# Patient Record
Sex: MALE | Race: WHITE
[De-identification: names, ages, dates, MRNs, and addresses within clinical notes are randomized per-mention and may not be internally consistent; named-entity substitution may affect disease eponyms.]

---

## 2017-10-21 ENCOUNTER — HOSPITAL ENCOUNTER (EMERGENCY)
Dept: HOSPITAL 8 - ED | Age: 27
Discharge: HOME | End: 2017-10-21
Payer: COMMERCIAL

## 2017-10-21 VITALS — DIASTOLIC BLOOD PRESSURE: 69 MMHG | SYSTOLIC BLOOD PRESSURE: 127 MMHG

## 2017-10-21 VITALS — HEIGHT: 70 IN | BODY MASS INDEX: 32.76 KG/M2 | WEIGHT: 228.84 LBS

## 2017-10-21 DIAGNOSIS — K92.1: Primary | ICD-10-CM

## 2017-10-21 LAB
AST SERPL-CCNC: 29 U/L (ref 15–37)
BUN SERPL-MCNC: 18 MG/DL (ref 7–18)
HCT VFR BLD CALC: 48.4 % (ref 39.2–51.8)
HGB BLD-MCNC: 16.4 G/DL (ref 13.7–18)
WBC # BLD AUTO: 5.6 X10^3/UL (ref 3.4–10)

## 2017-10-21 PROCEDURE — 85730 THROMBOPLASTIN TIME PARTIAL: CPT

## 2017-10-21 PROCEDURE — 99285 EMERGENCY DEPT VISIT HI MDM: CPT

## 2017-10-21 PROCEDURE — 74177 CT ABD & PELVIS W/CONTRAST: CPT

## 2017-10-21 PROCEDURE — 85610 PROTHROMBIN TIME: CPT

## 2017-10-21 PROCEDURE — 80053 COMPREHEN METABOLIC PANEL: CPT

## 2017-10-21 PROCEDURE — 85025 COMPLETE CBC W/AUTO DIFF WBC: CPT

## 2017-10-21 PROCEDURE — 36415 COLL VENOUS BLD VENIPUNCTURE: CPT

## 2018-10-16 ENCOUNTER — HOSPITAL ENCOUNTER (EMERGENCY)
Dept: HOSPITAL 8 - ED | Age: 28
Discharge: HOME | End: 2018-10-16
Payer: COMMERCIAL

## 2018-10-16 VITALS — WEIGHT: 198.42 LBS | HEIGHT: 70 IN | BODY MASS INDEX: 28.41 KG/M2

## 2018-10-16 VITALS — SYSTOLIC BLOOD PRESSURE: 142 MMHG | DIASTOLIC BLOOD PRESSURE: 78 MMHG

## 2018-10-16 DIAGNOSIS — F41.1: Primary | ICD-10-CM

## 2018-10-16 DIAGNOSIS — F30.11: ICD-10-CM

## 2018-10-16 PROCEDURE — 99284 EMERGENCY DEPT VISIT MOD MDM: CPT

## 2018-11-07 ENCOUNTER — HOSPITAL ENCOUNTER (OUTPATIENT)
Dept: HOSPITAL 8 - ED | Age: 28
Setting detail: OBSERVATION
LOS: 8 days | Discharge: HOME | End: 2018-11-15
Attending: INTERNAL MEDICINE | Admitting: INTERNAL MEDICINE
Payer: COMMERCIAL

## 2018-11-07 VITALS — DIASTOLIC BLOOD PRESSURE: 68 MMHG | SYSTOLIC BLOOD PRESSURE: 105 MMHG

## 2018-11-07 VITALS — BODY MASS INDEX: 25.25 KG/M2 | HEIGHT: 70 IN | WEIGHT: 176.37 LBS

## 2018-11-07 DIAGNOSIS — M54.9: ICD-10-CM

## 2018-11-07 DIAGNOSIS — F30.2: Primary | ICD-10-CM

## 2018-11-07 DIAGNOSIS — F41.1: ICD-10-CM

## 2018-11-07 DIAGNOSIS — G47.00: ICD-10-CM

## 2018-11-07 DIAGNOSIS — G89.29: ICD-10-CM

## 2018-11-07 DIAGNOSIS — Z91.14: ICD-10-CM

## 2018-11-07 DIAGNOSIS — Z81.8: ICD-10-CM

## 2018-11-07 DIAGNOSIS — R73.03: ICD-10-CM

## 2018-11-07 DIAGNOSIS — F23: ICD-10-CM

## 2018-11-07 LAB
ALBUMIN SERPL-MCNC: 4.2 G/DL (ref 3.4–5)
ANION GAP SERPL CALC-SCNC: 9 MMOL/L (ref 5–15)
APAP SERPL-MCNC: < 2 MCG/ML (ref 10–30)
BASOPHILS # BLD AUTO: 0.02 X10^3/UL (ref 0–0.1)
BASOPHILS NFR BLD AUTO: 0 % (ref 0–1)
CALCIUM SERPL-MCNC: 8.9 MG/DL (ref 8.5–10.1)
CHLORIDE SERPL-SCNC: 104 MMOL/L (ref 98–107)
CREAT SERPL-MCNC: 0.87 MG/DL (ref 0.7–1.3)
EOSINOPHIL # BLD AUTO: 0.02 X10^3/UL (ref 0–0.4)
EOSINOPHIL NFR BLD AUTO: 0 % (ref 1–7)
ERYTHROCYTE [DISTWIDTH] IN BLOOD BY AUTOMATED COUNT: 13.3 % (ref 9.4–14.8)
EST. AVERAGE GLUCOSE BLD GHB EST-MCNC: 114 MG/DL (ref 0–126)
HBA1C MFR BLD: 5.6 % (ref 4.2–6.3)
LYMPHOCYTES # BLD AUTO: 1.45 X10^3/UL (ref 1–3.4)
LYMPHOCYTES NFR BLD AUTO: 19 % (ref 22–44)
MCH RBC QN AUTO: 29.9 PG (ref 27.5–34.5)
MCHC RBC AUTO-ENTMCNC: 34.2 G/DL (ref 33.2–36.2)
MCV RBC AUTO: 87.5 FL (ref 81–97)
MD: NO
MONOCYTES # BLD AUTO: 0.51 X10^3/UL (ref 0.2–0.8)
MONOCYTES NFR BLD AUTO: 7 % (ref 2–9)
NEUTROPHILS # BLD AUTO: 5.8 X10^3/UL (ref 1.8–6.8)
NEUTROPHILS NFR BLD AUTO: 74 % (ref 42–75)
PLATELET # BLD AUTO: 249 X10^3/UL (ref 130–400)
PMV BLD AUTO: 7.3 FL (ref 7.4–10.4)
RBC # BLD AUTO: 5.35 X10^6/UL (ref 4.38–5.82)
VANCOMYCIN TROUGH SERPL-MCNC: < 1.7 MG/DL (ref 2.8–20)

## 2018-11-07 PROCEDURE — 80307 DRUG TEST PRSMV CHEM ANLYZR: CPT

## 2018-11-07 PROCEDURE — 80048 BASIC METABOLIC PNL TOTAL CA: CPT

## 2018-11-07 PROCEDURE — 80329 ANALGESICS NON-OPIOID 1 OR 2: CPT

## 2018-11-07 PROCEDURE — G0480 DRUG TEST DEF 1-7 CLASSES: HCPCS

## 2018-11-07 PROCEDURE — 85025 COMPLETE CBC W/AUTO DIFF WBC: CPT

## 2018-11-07 PROCEDURE — G0378 HOSPITAL OBSERVATION PER HR: HCPCS

## 2018-11-07 PROCEDURE — 82040 ASSAY OF SERUM ALBUMIN: CPT

## 2018-11-07 PROCEDURE — 99285 EMERGENCY DEPT VISIT HI MDM: CPT

## 2018-11-07 PROCEDURE — 36415 COLL VENOUS BLD VENIPUNCTURE: CPT

## 2018-11-07 PROCEDURE — 83036 HEMOGLOBIN GLYCOSYLATED A1C: CPT

## 2018-11-07 PROCEDURE — 93005 ELECTROCARDIOGRAM TRACING: CPT

## 2018-11-08 VITALS — DIASTOLIC BLOOD PRESSURE: 69 MMHG | SYSTOLIC BLOOD PRESSURE: 109 MMHG

## 2018-11-08 VITALS — SYSTOLIC BLOOD PRESSURE: 106 MMHG | DIASTOLIC BLOOD PRESSURE: 69 MMHG

## 2018-11-08 RX ADMIN — ZIPRASIDONE HYDROCHLORIDE SCH MG: 40 CAPSULE ORAL at 21:03

## 2018-11-08 RX ADMIN — Medication SCH CAP: at 11:00

## 2018-11-09 VITALS — SYSTOLIC BLOOD PRESSURE: 143 MMHG | DIASTOLIC BLOOD PRESSURE: 74 MMHG

## 2018-11-09 VITALS — DIASTOLIC BLOOD PRESSURE: 75 MMHG | SYSTOLIC BLOOD PRESSURE: 116 MMHG

## 2018-11-09 RX ADMIN — Medication SCH CAP: at 08:27

## 2018-11-09 RX ADMIN — ZIPRASIDONE HYDROCHLORIDE SCH MG: 40 CAPSULE ORAL at 21:00

## 2018-11-10 VITALS — SYSTOLIC BLOOD PRESSURE: 110 MMHG | DIASTOLIC BLOOD PRESSURE: 74 MMHG

## 2018-11-10 VITALS — SYSTOLIC BLOOD PRESSURE: 117 MMHG | DIASTOLIC BLOOD PRESSURE: 74 MMHG

## 2018-11-10 RX ADMIN — Medication SCH CAP: at 09:05

## 2018-11-10 RX ADMIN — MIRTAZAPINE SCH MG: 15 TABLET, FILM COATED ORAL at 21:00

## 2018-11-10 RX ADMIN — ZIPRASIDONE HCL SCH MG: 20 CAPSULE ORAL at 21:57

## 2018-11-10 RX ADMIN — ACETAMINOPHEN PRN MG: 325 TABLET, FILM COATED ORAL at 00:49

## 2018-11-11 VITALS — DIASTOLIC BLOOD PRESSURE: 79 MMHG | SYSTOLIC BLOOD PRESSURE: 117 MMHG

## 2018-11-11 VITALS — DIASTOLIC BLOOD PRESSURE: 79 MMHG | SYSTOLIC BLOOD PRESSURE: 128 MMHG

## 2018-11-11 RX ADMIN — Medication SCH CAP: at 08:22

## 2018-11-11 RX ADMIN — ACETAMINOPHEN PRN MG: 325 TABLET, FILM COATED ORAL at 00:22

## 2018-11-11 RX ADMIN — MIRTAZAPINE SCH MG: 15 TABLET, FILM COATED ORAL at 22:16

## 2018-11-11 RX ADMIN — ZIPRASIDONE HCL SCH MG: 20 CAPSULE ORAL at 21:49

## 2018-11-11 RX ADMIN — ZIPRASIDONE HCL SCH MG: 20 CAPSULE ORAL at 08:30

## 2018-11-12 VITALS — SYSTOLIC BLOOD PRESSURE: 119 MMHG | DIASTOLIC BLOOD PRESSURE: 80 MMHG

## 2018-11-12 VITALS — DIASTOLIC BLOOD PRESSURE: 81 MMHG | SYSTOLIC BLOOD PRESSURE: 136 MMHG

## 2018-11-12 RX ADMIN — ZIPRASIDONE HCL SCH MG: 20 CAPSULE ORAL at 22:00

## 2018-11-12 RX ADMIN — MIRTAZAPINE SCH MG: 15 TABLET, FILM COATED ORAL at 22:00

## 2018-11-12 RX ADMIN — Medication SCH CAP: at 08:18

## 2018-11-12 RX ADMIN — ZIPRASIDONE HCL SCH MG: 20 CAPSULE ORAL at 08:18

## 2018-11-13 VITALS — SYSTOLIC BLOOD PRESSURE: 117 MMHG | DIASTOLIC BLOOD PRESSURE: 74 MMHG

## 2018-11-13 VITALS — DIASTOLIC BLOOD PRESSURE: 75 MMHG | SYSTOLIC BLOOD PRESSURE: 126 MMHG

## 2018-11-13 RX ADMIN — Medication SCH CAP: at 08:30

## 2018-11-13 RX ADMIN — ACETAMINOPHEN PRN MG: 325 TABLET, FILM COATED ORAL at 02:45

## 2018-11-13 RX ADMIN — ZIPRASIDONE HCL SCH MG: 20 CAPSULE ORAL at 08:29

## 2018-11-13 RX ADMIN — ZIPRASIDONE HCL SCH MG: 20 CAPSULE ORAL at 20:54

## 2018-11-14 VITALS — SYSTOLIC BLOOD PRESSURE: 121 MMHG | DIASTOLIC BLOOD PRESSURE: 81 MMHG

## 2018-11-14 VITALS — DIASTOLIC BLOOD PRESSURE: 74 MMHG | SYSTOLIC BLOOD PRESSURE: 136 MMHG

## 2018-11-14 RX ADMIN — Medication SCH CAP: at 08:21

## 2018-11-14 RX ADMIN — ZIPRASIDONE HCL SCH MG: 20 CAPSULE ORAL at 08:20

## 2018-11-14 RX ADMIN — ACETAMINOPHEN PRN MG: 325 TABLET, FILM COATED ORAL at 10:11

## 2018-11-14 RX ADMIN — ACETAMINOPHEN PRN MG: 325 TABLET, FILM COATED ORAL at 20:10

## 2018-11-15 VITALS — SYSTOLIC BLOOD PRESSURE: 120 MMHG | DIASTOLIC BLOOD PRESSURE: 70 MMHG

## 2018-11-15 RX ADMIN — Medication SCH CAP: at 09:00

## 2021-02-23 ENCOUNTER — HOSPITAL ENCOUNTER (EMERGENCY)
Facility: MEDICAL CENTER | Age: 31
End: 2021-02-23
Attending: EMERGENCY MEDICINE

## 2021-02-23 VITALS
DIASTOLIC BLOOD PRESSURE: 74 MMHG | HEIGHT: 70 IN | OXYGEN SATURATION: 97 % | TEMPERATURE: 98.3 F | RESPIRATION RATE: 16 BRPM | SYSTOLIC BLOOD PRESSURE: 135 MMHG | WEIGHT: 204.15 LBS | BODY MASS INDEX: 29.23 KG/M2 | HEART RATE: 79 BPM

## 2021-02-23 DIAGNOSIS — S00.11XA CONTUSION OF RIGHT EYELID, INITIAL ENCOUNTER: ICD-10-CM

## 2021-02-23 DIAGNOSIS — S01.111A RIGHT EYELID LACERATION, INITIAL ENCOUNTER: ICD-10-CM

## 2021-02-23 PROCEDURE — 700101 HCHG RX REV CODE 250: Performed by: EMERGENCY MEDICINE

## 2021-02-23 PROCEDURE — 99283 EMERGENCY DEPT VISIT LOW MDM: CPT | Mod: EDC

## 2021-02-23 RX ORDER — ERYTHROMYCIN 5 MG/G
OINTMENT OPHTHALMIC ONCE
Status: COMPLETED | OUTPATIENT
Start: 2021-02-23 | End: 2021-02-23

## 2021-02-23 RX ORDER — ZIPRASIDONE HYDROCHLORIDE 20 MG/1
200 CAPSULE ORAL 2 TIMES DAILY
COMMUNITY

## 2021-02-23 RX ORDER — PROPARACAINE HYDROCHLORIDE 5 MG/ML
2 SOLUTION/ DROPS OPHTHALMIC ONCE
Status: COMPLETED | OUTPATIENT
Start: 2021-02-23 | End: 2021-02-23

## 2021-02-23 RX ORDER — ERYTHROMYCIN 5 MG/G
OINTMENT OPHTHALMIC ONCE
Status: DISCONTINUED | OUTPATIENT
Start: 2021-02-23 | End: 2021-02-23

## 2021-02-23 RX ORDER — LAMOTRIGINE 100 MG/1
400 TABLET ORAL DAILY
COMMUNITY

## 2021-02-23 RX ADMIN — FLUORESCEIN SODIUM 1 MG: 1 STRIP OPHTHALMIC at 15:45

## 2021-02-23 RX ADMIN — ERYTHROMYCIN 1 DOSE: 5 OINTMENT OPHTHALMIC at 15:44

## 2021-02-23 RX ADMIN — PROPARACAINE HYDROCHLORIDE 2 DROP: 5 SOLUTION/ DROPS OPHTHALMIC at 15:45

## 2021-02-23 NOTE — ED TRIAGE NOTES
Cabrera Vazquez  30 y.o.  Chief Complaint   Patient presents with   • Eye Injury     son threw book at right eye, pt reports blurry vision due to the drainage     Patient to triage with above complaint.  Pt denies vision changes other than blurry due to drainage.  Eye busied and swollen.     Vitals:    02/23/21 1436   BP: 119/81   Pulse: 90   Resp: 14   Temp: 37.3 °C (99.1 °F)   SpO2: 99%     Triage process explained to patient, apologized for wait time, and returned to lobby.  Pt informed to notify staff of any change in condition. NAD at this time.

## 2021-02-23 NOTE — DISCHARGE INSTRUCTIONS
Use the erythromycin ointment 4 times per day for the next few days, applying one quarter of an inch underneath your lower lid the way we showed you here.  You can do this until you are sure you are feeling back to normal.  Tylenol and ibuprofen and cool compresses can help with the bruising and swelling, but the bruising is likely to get worse before it gets better, just like a black eye.  As long as you are not having worsening vision or worsening pain, it is okay to continue treating herself at home.  If you have any worsening concerns, use the contact information provided here for ophthalmology, or come to the nearest emergency department if ophthalmology cannot see you promptly.

## 2021-02-23 NOTE — ED PROVIDER NOTES
"ED Provider Note    Scribed for Gray Goddard M.D. by Elvin Mcfarlane. 2/23/2021  3:17 PM    CHIEF COMPLAINT  Chief Complaint   Patient presents with   • Eye Injury     son threw book at right eye, pt reports blurry vision due to the drainage       HPI  Cabrera Vazquez is a 30 y.o. male who presents to the Emergency Room with mild right eye pain onset earlier today. Per the patient, his son threw a children's book at his eye while they were wrestling. He reports additional symptoms of blurry vision and drainage, and denies other vision changes. No other exacerbating or alleviating factors were noted.  He has not had weakness or dizziness or confusion.  He presented independently without difficulty.  He is calm and cooperative.  He does not wear glasses or corrective lenses.  He is not on blood thinners and has no history of easy bleeding or bruising.  He said no recent illness including flulike symptoms.    REVIEW OF SYSTEMS  See HPI for further details.    PAST MEDICAL HISTORY    None pertinent    SOCIAL HISTORY  Social History     Tobacco Use   • Smoking status: Never Smoker   • Smokeless tobacco: Never Used   Substance and Sexual Activity   • Alcohol use: Never   • Drug use: No   • Sexual activity: Not noted        SURGICAL HISTORY  patient denies any surgical history    CURRENT MEDICATIONS  Home Medications     Reviewed by Jennifer Monsalve R.N. (Registered Nurse) on 02/23/21 at 1447  Med List Status: Partial   Medication Last Dose Status   buPROPion (WELLBUTRIN) 100 MG Tab  Active   lamoTRIgine (LAMICTAL) 100 MG Tab  Active   olanzapine (ZYPREXA) 5 MG Tab  Active   ziprasidone (GEODON) 20 MG Cap  Active                ALLERGIES  No Known Allergies    PHYSICAL EXAM  VITAL SIGNS: /81   Pulse 90   Temp 37.3 °C (99.1 °F) (Temporal)   Resp 14   Ht 1.778 m (5' 10\")   Wt 92.6 kg (204 lb 2.3 oz)   SpO2 99%   BMI 29.29 kg/m²   Pulse ox interpretation: I interpret this pulse ox as " normal.  Constitutional: Alert in no apparent distress.  HENT: Normocephalic, no corneal abrasions visualized upon exam with fluorescein.  PERRLA, EOMI.  No Rebecca sign.  Bruising and swelling to the right upper eyelid, small cut to the upper eyelid, measuring 1 to 2 mm, not violating the lid border or involving lashes. Bilateral external ears normal. Nose normal.   Eyes: Conjunctiva normal, non-icteric.   Heart: Regular rate and rythm, no murmurs.    Lungs: Clear to auscultation bilaterally.  Skin: Warm, Dry, No erythema, No rash.   Neurologic: Alert, Grossly non-focal.   Psychiatric: Affect normal, Judgment normal, Mood normal, Appears appropriate and not intoxicated.         COURSE & MEDICAL DECISION MAKING  The patient's VS, Nurses notes reviewed. (See chart for details)    3:17 PM Patient seen and examined at bedside. Applied proparacaine 0.5% and fluorescein ophthalmic strip 1 mg in the patient's eye at this time. Evaluate the eye further with a Woods lamp any abrasions to the eye. Informed the patient that there doesn't appear to be any injury to the eye itself.  We discussed increased intraocular pressure, but the patient has no pain to the eye itself, only lid pain, and after fluorescein, does not feel his vision is blurry.  Explained that for any possible irritation, nurses will show him how he can apply antibiotic ointment. Discussed plan to check the patient's visual acuity, and plans for discharge. Patient verbalizes understanding and agreement to this plan of care. Patient will be treated with proparacaine 0.5%, erythromycin ophthalmic ointment, and fluorescein ophthalmic strip 1 mg for his symptoms.     3:41 PM Recheck: Patient re-evaluated at bedside. Patient's visual acuity: R: 20/30, L: 20/20, B: 20/15. Patient reports feeling well. Discussed patient's condition and treatment plan, including plans for discharge.  The patient understood and is in agreement.  Have given contact information for  ophthalmology in case he is feeling worse.  We discussed emergency department return precautions as well.  He continues to report feeling better, now without any pain at all, which he attributes to the fluorescein and erythromycin ointment.  We showed him how to put a first dose on, which he found soothing, and I think it is a reasonable intervention, despite the lack of any obvious corneal abrasion.      The patient will return for new or worsening symptoms and is stable at the time of discharge.    The patient is referred to a primary physician for blood pressure management, diabetic screening, and for all other preventative health concerns.    DISPOSITION:  Patient will be discharged home in stable condition.    FOLLOW UP:  Juan Carlos Bee M.D.  [address] [place] [id number]  [phone]      If symptoms worsen    [facility], Emergency Dept  [address] [place] [id number] [phone]    If symptoms worsen      FINAL IMPRESSION  1. Right eyelid laceration, initial encounter    2. Contusion of right eyelid, initial encounter         Elvin FRANCO (Scribe), am scribing for, and in the presence of, Gray Goddard M.D..    Electronically signed by: Elvin Mcfarlane (Scribe), 2/23/2021    IGray M.D. personally performed the services described in this documentation, as scribed by Elvin Mcfarlane in my presence, and it is both accurate and complete.    E    The note accurately reflects work and decisions made by me.  Gray Goddard M.D.  2/23/2021  4:06 PM

## 2021-02-24 NOTE — ED NOTES
"Cabrera Vazquez D/C'd.  Discharge instructions including s/s to return to ED, follow up appointments, hydration importance and pain managment  provided to pt.    Pt verbalized understanding with no further questions and concerns.    Copy of discharge provided to pt.  Signed copy in chart.    Pt ambulates out of department; pt in NAD, awake, alert, and oriented.   VS /74   Pulse 79   Temp 36.8 °C (98.3 °F) (Temporal)   Resp 16   Ht 1.778 m (5' 10\")   Wt 92.6 kg (204 lb 2.3 oz)   SpO2 97%   BMI 29.29 kg/m²     "

## 2021-09-08 ENCOUNTER — NON-PROVIDER VISIT (OUTPATIENT)
Dept: URGENT CARE | Facility: PHYSICIAN GROUP | Age: 31
End: 2021-09-08

## 2021-09-08 DIAGNOSIS — Z02.1 PRE-EMPLOYMENT DRUG SCREENING: ICD-10-CM

## 2021-09-08 LAB
AMP AMPHETAMINE: NORMAL
AMP AMPHETAMINE: NORMAL
BAR BARBITURATES: NORMAL
BZO BENZODIAZEPINES: NORMAL
COC COCAINE: NORMAL
COC COCAINE: NORMAL
INT CON NEG: NORMAL
INT CON NEG: NORMAL
INT CON POS: NORMAL
INT CON POS: NORMAL
MDMA ECSTASY: NORMAL
MET METHAMPHETAMINES: NORMAL
MET METHAMPHETAMINES: NORMAL
MTD METHADONE: NORMAL
OPI OPIATES: NORMAL
OPI OPIATES: NORMAL
OXY OXYCODONE: NORMAL
PCP PHENCYCLIDINE: NORMAL
PCP PHENCYCLIDINE: NORMAL
POC DRUG COMMENT 753798-OCCUPATIONAL HEALTH: NORMAL
POC URINE DRUG SCREEN OCDRS: NEGATIVE
THC: NORMAL
THC: NORMAL

## 2021-09-08 PROCEDURE — 80305 DRUG TEST PRSMV DIR OPT OBS: CPT | Performed by: FAMILY MEDICINE

## 2022-05-23 ENCOUNTER — NON-PROVIDER VISIT (OUTPATIENT)
Dept: URGENT CARE | Facility: PHYSICIAN GROUP | Age: 32
End: 2022-05-23

## 2022-05-23 DIAGNOSIS — Z02.1 PRE-EMPLOYMENT DRUG SCREENING: ICD-10-CM

## 2022-05-23 LAB
AMP AMPHETAMINE: NORMAL
COC COCAINE: NORMAL
INT CON NEG: NORMAL
INT CON POS: NORMAL
MET METHAMPHETAMINES: NORMAL
OPI OPIATES: NORMAL
PCP PHENCYCLIDINE: NORMAL
POC DRUG COMMENT 753798-OCCUPATIONAL HEALTH: NORMAL
THC: NORMAL

## 2022-05-23 PROCEDURE — 80305 DRUG TEST PRSMV DIR OPT OBS: CPT | Performed by: NURSE PRACTITIONER

## 2023-06-07 ENCOUNTER — NON-PROVIDER VISIT (OUTPATIENT)
Dept: URGENT CARE | Facility: PHYSICIAN GROUP | Age: 33
End: 2023-06-07

## 2023-06-07 DIAGNOSIS — Z02.1 PRE-EMPLOYMENT DRUG SCREENING: ICD-10-CM

## 2023-06-07 PROCEDURE — 80305 DRUG TEST PRSMV DIR OPT OBS: CPT | Performed by: NURSE PRACTITIONER

## 2024-07-07 ENCOUNTER — OFFICE VISIT (OUTPATIENT)
Dept: URGENT CARE | Facility: PHYSICIAN GROUP | Age: 34
End: 2024-07-07

## 2024-07-07 VITALS
DIASTOLIC BLOOD PRESSURE: 68 MMHG | BODY MASS INDEX: 30.06 KG/M2 | WEIGHT: 210 LBS | HEIGHT: 70 IN | SYSTOLIC BLOOD PRESSURE: 114 MMHG | OXYGEN SATURATION: 98 % | RESPIRATION RATE: 14 BRPM | HEART RATE: 94 BPM | TEMPERATURE: 98.2 F

## 2024-07-07 DIAGNOSIS — B96.89 BACTERIAL CONJUNCTIVITIS OF BOTH EYES: ICD-10-CM

## 2024-07-07 DIAGNOSIS — H10.9 BACTERIAL CONJUNCTIVITIS OF BOTH EYES: ICD-10-CM

## 2024-07-07 PROCEDURE — 99202 OFFICE O/P NEW SF 15 MIN: CPT

## 2024-07-07 PROCEDURE — 3078F DIAST BP <80 MM HG: CPT

## 2024-07-07 PROCEDURE — 3074F SYST BP LT 130 MM HG: CPT

## 2024-07-07 RX ORDER — MOXIFLOXACIN 5 MG/ML
SOLUTION/ DROPS OPHTHALMIC 3 TIMES DAILY
COMMUNITY
End: 2024-07-07

## 2024-07-07 RX ORDER — TOBRAMYCIN 3 MG/ML
1 SOLUTION/ DROPS OPHTHALMIC EVERY 4 HOURS
Qty: 5 ML | Refills: 0 | Status: SHIPPED | OUTPATIENT
Start: 2024-07-07

## 2024-07-07 ASSESSMENT — ENCOUNTER SYMPTOMS
BLURRED VISION: 0
NAUSEA: 0
EYE DISCHARGE: 1
FEVER: 0
FOREIGN BODY SENSATION: 1
DOUBLE VISION: 0
EYE PAIN: 0
EYE ITCHING: 1
VOMITING: 0
EYE REDNESS: 1
PHOTOPHOBIA: 0